# Patient Record
Sex: FEMALE | Race: WHITE | Employment: FULL TIME | URBAN - METROPOLITAN AREA
[De-identification: names, ages, dates, MRNs, and addresses within clinical notes are randomized per-mention and may not be internally consistent; named-entity substitution may affect disease eponyms.]

---

## 2018-08-15 ENCOUNTER — OFFICE VISIT (OUTPATIENT)
Dept: INTERNAL MEDICINE CLINIC | Age: 48
End: 2018-08-15

## 2018-08-15 VITALS
BODY MASS INDEX: 30.99 KG/M2 | WEIGHT: 186 LBS | SYSTOLIC BLOOD PRESSURE: 118 MMHG | HEIGHT: 65 IN | HEART RATE: 78 BPM | DIASTOLIC BLOOD PRESSURE: 80 MMHG

## 2018-08-15 DIAGNOSIS — Z13.220 LIPID SCREENING: ICD-10-CM

## 2018-08-15 DIAGNOSIS — Z00.00 ANNUAL PHYSICAL EXAM: Primary | ICD-10-CM

## 2018-08-15 DIAGNOSIS — Z13.1 SCREENING FOR DIABETES MELLITUS: ICD-10-CM

## 2018-08-15 PROCEDURE — 99396 PREV VISIT EST AGE 40-64: CPT | Performed by: INTERNAL MEDICINE

## 2018-08-15 PROCEDURE — 90715 TDAP VACCINE 7 YRS/> IM: CPT | Performed by: INTERNAL MEDICINE

## 2018-08-15 PROCEDURE — 90471 IMMUNIZATION ADMIN: CPT | Performed by: INTERNAL MEDICINE

## 2018-08-15 RX ORDER — FLUOXETINE 10 MG/1
10 CAPSULE ORAL
COMMUNITY
End: 2018-10-23 | Stop reason: SDUPTHER

## 2018-08-15 ASSESSMENT — PATIENT HEALTH QUESTIONNAIRE - PHQ9
2. FEELING DOWN, DEPRESSED OR HOPELESS: 0
SUM OF ALL RESPONSES TO PHQ9 QUESTIONS 1 & 2: 0
SUM OF ALL RESPONSES TO PHQ QUESTIONS 1-9: 0
SUM OF ALL RESPONSES TO PHQ QUESTIONS 1-9: 0
1. LITTLE INTEREST OR PLEASURE IN DOING THINGS: 0

## 2018-10-16 ENCOUNTER — TELEPHONE (OUTPATIENT)
Dept: INTERNAL MEDICINE CLINIC | Age: 48
End: 2018-10-16

## 2018-10-23 ENCOUNTER — OFFICE VISIT (OUTPATIENT)
Dept: INTERNAL MEDICINE CLINIC | Age: 48
End: 2018-10-23
Payer: COMMERCIAL

## 2018-10-23 VITALS
OXYGEN SATURATION: 98 % | BODY MASS INDEX: 30.55 KG/M2 | HEART RATE: 74 BPM | DIASTOLIC BLOOD PRESSURE: 80 MMHG | WEIGHT: 185 LBS | SYSTOLIC BLOOD PRESSURE: 126 MMHG

## 2018-10-23 DIAGNOSIS — F32.81 PMDD (PREMENSTRUAL DYSPHORIC DISORDER): ICD-10-CM

## 2018-10-23 DIAGNOSIS — G43.109 MIGRAINE WITH AURA AND WITHOUT STATUS MIGRAINOSUS, NOT INTRACTABLE: Primary | ICD-10-CM

## 2018-10-23 DIAGNOSIS — J30.9 ALLERGIC RHINITIS, UNSPECIFIED SEASONALITY, UNSPECIFIED TRIGGER: ICD-10-CM

## 2018-10-23 PROCEDURE — 99214 OFFICE O/P EST MOD 30 MIN: CPT | Performed by: INTERNAL MEDICINE

## 2018-10-23 RX ORDER — FLUOXETINE 10 MG/1
10 CAPSULE ORAL DAILY
Qty: 90 CAPSULE | Refills: 1 | Status: SHIPPED | OUTPATIENT
Start: 2018-10-23 | End: 2019-04-27 | Stop reason: SDUPTHER

## 2018-10-23 RX ORDER — ELETRIPTAN HYDROBROMIDE 20 MG/1
20 TABLET, FILM COATED ORAL
Qty: 9 TABLET | Refills: 3 | Status: SHIPPED | OUTPATIENT
Start: 2018-10-23 | End: 2019-12-20 | Stop reason: SDUPTHER

## 2018-10-23 NOTE — PROGRESS NOTES
per HPI. Patient Active Problem List   Diagnosis    Migraine    Menorrhagia    PMDD (premenstrual dysphoric disorder)     Prior to Visit Medications    Medication Sig Taking? Authorizing Provider   FLUoxetine (PROZAC) 10 MG capsule Take 1 capsule by mouth daily Yes Clee Fowler MD   eletriptan (RELPAX) 20 MG tablet Take 1 tablet by mouth once as needed for Migraine may repeat in 2 hours if necessary Yes Cele Fowler MD   fluticasone (FLONASE) 50 MCG/ACT nasal spray 2 sprays by Nasal route daily Yes Cele Fowler MD   ibuprofen (ADVIL;MOTRIN) 400 MG tablet Take 400 mg by mouth every 6 hours as needed for Pain. Yes Historical Provider, MD   Multiple Vitamin (MULTIVITAMIN PO) Take  by mouth daily. Also takes calcium magnesium and zinc supplement Yes Historical Provider, MD     Allergies   Allergen Reactions    Imitrex [Sumatriptan]      Worse nausea and \"weird feeling\"     Social History   Substance Use Topics    Smoking status: Never Smoker    Smokeless tobacco: Never Used    Alcohol use 0.0 - 2.4 oz/week     3 Glasses of wine per week       BP Readings from Last 3 Encounters:   10/23/18 126/80   08/15/18 118/80   03/25/16 120/74     Wt Readings from Last 3 Encounters:   10/23/18 185 lb (83.9 kg)   08/15/18 186 lb (84.4 kg)   03/25/16 174 lb (78.9 kg)     OBJECTIVE:  Vitals:    10/23/18 1129   BP: 126/80   Pulse: 74   SpO2: 98%   Weight: 185 lb (83.9 kg)       Physical Exam   Constitutional: No distress. HENT:   Fullness of both tympanic membranes, right more than left. No redness. External auditory canals clear. Cobblestoning of the oropharynx   Eyes: Pupils are equal, round, and reactive to light. EOM are normal.   Cardiovascular: Normal rate and regular rhythm. Pulmonary/Chest: Effort normal and breath sounds normal.   Musculoskeletal: She exhibits no edema. Psychiatric: She has a normal mood and affect.  Her behavior is normal.

## 2019-04-29 NOTE — TELEPHONE ENCOUNTER
Last visit: 10/23/18  Last filled: 10/23/19  Next visit: no follow up indicated on last visit, does she need follow up?

## 2019-04-30 NOTE — TELEPHONE ENCOUNTER
I will refill, but afraid message will be missed, so not signing yet until I can tell it's been addressed. Needs to schedule routine follow-up appointment.

## 2019-05-01 RX ORDER — FLUOXETINE 10 MG/1
10 CAPSULE ORAL DAILY
Qty: 90 CAPSULE | Refills: 0 | Status: SHIPPED | OUTPATIENT
Start: 2019-05-01 | End: 2019-07-12 | Stop reason: SDUPTHER

## 2019-05-31 ENCOUNTER — OFFICE VISIT (OUTPATIENT)
Dept: INTERNAL MEDICINE CLINIC | Age: 49
End: 2019-05-31
Payer: COMMERCIAL

## 2019-05-31 VITALS
DIASTOLIC BLOOD PRESSURE: 72 MMHG | OXYGEN SATURATION: 98 % | WEIGHT: 188 LBS | HEART RATE: 72 BPM | BODY MASS INDEX: 31.05 KG/M2 | SYSTOLIC BLOOD PRESSURE: 118 MMHG

## 2019-05-31 DIAGNOSIS — G43.109 MIGRAINE WITH AURA AND WITHOUT STATUS MIGRAINOSUS, NOT INTRACTABLE: Primary | ICD-10-CM

## 2019-05-31 DIAGNOSIS — J30.9 ALLERGIC RHINITIS, UNSPECIFIED SEASONALITY, UNSPECIFIED TRIGGER: ICD-10-CM

## 2019-05-31 PROCEDURE — 99214 OFFICE O/P EST MOD 30 MIN: CPT | Performed by: INTERNAL MEDICINE

## 2019-05-31 RX ORDER — TOPIRAMATE 50 MG/1
50 TABLET, FILM COATED ORAL NIGHTLY
Qty: 30 TABLET | Refills: 1 | Status: SHIPPED | OUTPATIENT
Start: 2019-05-31 | End: 2019-07-12 | Stop reason: SDUPTHER

## 2019-05-31 NOTE — PROGRESS NOTES
[facility]  Internal Medicine Progress Note  Wilmar Wellington MD  5/31/2019    Karishma MCDONALDJ:[birth year]    BP Readings from Last 3 Encounters:   05/31/19 118/72   10/23/18 126/80   08/15/18 118/80      Wt Readings from Last 3 Encounters:   05/31/19 188 lb (85.3 kg)   10/23/18 185 lb (83.9 kg)   08/15/18 186 lb (84.4 kg)       ASSESSMENT/PLAN:   Migraine with aura and without status migrainosus, not intractable  Still very symptomatic with 10-11 migraines most months. Start topamax 50 mg, starting at 1/2 tab first week. Discussed risks and benefits of the medication, including most common side effects. Allergic rhinitis, unspecified seasonality, unspecified trigger  Better with flonase  Continue  t week. Discussed medications with patient who voiced understanding of their use and indications. All questions answered. Return in about 1 month (around 6/30/2019) for 15 min- migraine. This note was generated completely or in part utilizing Dragon dictation speech recognition software. Occasionally, words are mistranscribed and despite editing, the text may contain inaccuracies due to incorrect word recognition. If further clarification is needed please contact the office at [phone] assistant triage:   Chief Complaint   Patient presents with    Follow-up       HPI:   54 y.o. female here today for evaluation of the following medical conditions/concerns: Allergies- using flonase consistently. Seems to help with congestion and stuffiness but not with migraines. Migraines really fluctuate. Nov-Dec 10-11/month. Down in Jan, bad Feb, good March,  5/month, April bad again. Often will then have day when everything hurts and feels horrible, and then gets migraine. Mood will be worse at those times as well. Very consistent now with flonase and fluoxetine. In the past was on topamax. Not a good sleeper ever. Tosses and turns. Melatonin caused weird.      Review of Systems As per HPI. Prior to Visit Medications    Medication Sig Taking? Authorizing Provider   topiramate (TOPAMAX) 50 MG tablet Take 1 tablet by mouth nightly Stat with 1/2 tab first week. Yes Jered Ornelas MD   FLUoxetine (PROZAC) 10 MG capsule TAKE 1 CAPSULE BY MOUTH DAILY Yes Jered Ornelas MD   eletriptan (RELPAX) 20 MG tablet Take 1 tablet by mouth once as needed for Migraine may repeat in 2 hours if necessary Yes Jered Ornelas MD   fluticasone (FLONASE) 50 MCG/ACT nasal spray 2 sprays by Nasal route daily Yes Jered Ornelas MD   ibuprofen (ADVIL;MOTRIN) 400 MG tablet Take 400 mg by mouth every 6 hours as needed for Pain. Yes Historical Provider, MD   Multiple Vitamin (MULTIVITAMIN PO) Take  by mouth daily. Also takes calcium magnesium and zinc supplement Yes Historical Provider, MD     Social History     Tobacco Use   Smoking Status Never Smoker   Smokeless Tobacco Never Used       OBJECTIVE:    Vitals:    05/31/19 1456   BP: 118/72   Pulse: 72   SpO2: 98%   Weight: 188 lb (85.3 kg)     Body mass index is 31.05 kg/m². Physical Exam   Constitutional: No distress. Cardiovascular: Normal rate and regular rhythm. Pulmonary/Chest: Effort normal and breath sounds normal.   Musculoskeletal: She exhibits no edema. Psychiatric: She has a normal mood and affect.  Her behavior is normal.

## 2019-07-12 ENCOUNTER — OFFICE VISIT (OUTPATIENT)
Dept: INTERNAL MEDICINE CLINIC | Age: 49
End: 2019-07-12
Payer: COMMERCIAL

## 2019-07-12 VITALS
DIASTOLIC BLOOD PRESSURE: 64 MMHG | HEART RATE: 66 BPM | BODY MASS INDEX: 31.05 KG/M2 | WEIGHT: 188 LBS | OXYGEN SATURATION: 98 % | SYSTOLIC BLOOD PRESSURE: 122 MMHG

## 2019-07-12 DIAGNOSIS — G43.109 MIGRAINE WITH AURA AND WITHOUT STATUS MIGRAINOSUS, NOT INTRACTABLE: Primary | ICD-10-CM

## 2019-07-12 PROCEDURE — 99213 OFFICE O/P EST LOW 20 MIN: CPT | Performed by: INTERNAL MEDICINE

## 2019-07-12 RX ORDER — FLUOXETINE 10 MG/1
10 CAPSULE ORAL DAILY
Qty: 90 CAPSULE | Refills: 1 | Status: SHIPPED | OUTPATIENT
Start: 2019-07-12 | End: 2019-12-19 | Stop reason: SDUPTHER

## 2019-07-12 RX ORDER — TOPIRAMATE 50 MG/1
50 TABLET, FILM COATED ORAL NIGHTLY
Qty: 90 TABLET | Refills: 1 | Status: SHIPPED | OUTPATIENT
Start: 2019-07-12 | End: 2019-12-20 | Stop reason: SDUPTHER

## 2019-07-12 NOTE — PROGRESS NOTES
[facility]  Internal Medicine Progress Note  Jaret Longoria MD  2019    Ronen Stark  :1970    ASSESSMENT/PLAN:   Migraine with aura and without status migrainosus, not intractable  Good response to Topamax 50 mg.  Trivial side effects, tolerable. Continue same dose    Discussed medications with patient who voiced understanding of their use and indications. All questions answered. Return in about 5 months (around 2019) for CPE. This note was generated completely or in part utilizing Dragon dictation speech recognition software. Occasionally, words are mistranscribed and despite editing, the text may contain inaccuracies due to incorrect word recognition. If further clarification is needed please contact the office at [phone] triage:   Chief Complaint   Patient presents with    Follow-up       HPI:   54 y.o. female here today for follow-up of migraines    Added Topamax for migraine prevention when seen . Has done well. Has seen significant reduction in migraine frequency. Had just 1 migraine and then last week had an aura that did not go into a headache. Slight decrease in appetite but no weight loss. Just back from cruise. Some tingling. Trivial. Gets a little tired in afternoon, but better as on it longer. Review of Systems As per HPI. Prior to Visit Medications    Medication Sig Taking? Authorizing Provider   topiramate (TOPAMAX) 50 MG tablet Take 1 tablet by mouth nightly Yes Jaret Longoria MD   FLUoxetine (PROZAC) 10 MG capsule Take 1 capsule by mouth daily Yes Jaret Longoria MD   eletriptan (RELPAX) 20 MG tablet Take 1 tablet by mouth once as needed for Migraine may repeat in 2 hours if necessary Yes Jaret Longoria MD   fluticasone (FLONASE) 50 MCG/ACT nasal spray 2 sprays by Nasal route daily Yes Jaret Longoria MD   ibuprofen (ADVIL;MOTRIN) 400 MG tablet Take 400 mg by mouth every 6 hours as needed for Pain.  Yes Historical Provider, MD   Multiple Vitamin (MULTIVITAMIN PO) Take  by mouth daily. Also takes calcium magnesium and zinc supplement Yes Historical Provider, MD     Social History     Tobacco Use   Smoking Status Never Smoker   Smokeless Tobacco Never Used       OBJECTIVE:  BP Readings from Last 3 Encounters:   07/12/19 122/64   05/31/19 118/72   10/23/18 126/80      Wt Readings from Last 3 Encounters:   07/12/19 188 lb (85.3 kg)   05/31/19 188 lb (85.3 kg)   10/23/18 185 lb (83.9 kg)     Vitals:    07/12/19 1545   BP: 122/64   Pulse: 66   SpO2: 98%   Weight: 188 lb (85.3 kg)     Body mass index is 31.05 kg/m². Physical Exam   Constitutional: No distress. Psychiatric: She has a normal mood and affect.  Her behavior is normal.

## 2019-08-15 RX ORDER — FLUOXETINE 10 MG/1
10 CAPSULE ORAL DAILY
Qty: 90 CAPSULE | Refills: 0 | Status: SHIPPED | OUTPATIENT
Start: 2019-08-15 | End: 2019-12-20 | Stop reason: SDUPTHER

## 2019-12-20 ENCOUNTER — OFFICE VISIT (OUTPATIENT)
Dept: INTERNAL MEDICINE CLINIC | Age: 49
End: 2019-12-20
Payer: COMMERCIAL

## 2019-12-20 VITALS
BODY MASS INDEX: 29.23 KG/M2 | DIASTOLIC BLOOD PRESSURE: 72 MMHG | OXYGEN SATURATION: 98 % | HEART RATE: 74 BPM | SYSTOLIC BLOOD PRESSURE: 128 MMHG | WEIGHT: 177 LBS

## 2019-12-20 DIAGNOSIS — Z00.00 ANNUAL PHYSICAL EXAM: Primary | ICD-10-CM

## 2019-12-20 DIAGNOSIS — H91.90 DECREASED HEARING, UNSPECIFIED LATERALITY: ICD-10-CM

## 2019-12-20 DIAGNOSIS — Z13.220 LIPID SCREENING: ICD-10-CM

## 2019-12-20 DIAGNOSIS — H93.19 TINNITUS, UNSPECIFIED LATERALITY: ICD-10-CM

## 2019-12-20 DIAGNOSIS — G43.909 MIGRAINE SYNDROME: ICD-10-CM

## 2019-12-20 DIAGNOSIS — Z13.1 SCREENING FOR DIABETES MELLITUS: ICD-10-CM

## 2019-12-20 PROCEDURE — 99396 PREV VISIT EST AGE 40-64: CPT | Performed by: INTERNAL MEDICINE

## 2019-12-20 RX ORDER — TOPIRAMATE 50 MG/1
50 TABLET, FILM COATED ORAL NIGHTLY
Qty: 90 TABLET | Refills: 1 | Status: SHIPPED | OUTPATIENT
Start: 2019-12-20 | End: 2020-12-02

## 2019-12-20 RX ORDER — FLUOXETINE 10 MG/1
10 CAPSULE ORAL DAILY
Qty: 90 CAPSULE | Refills: 0 | Status: SHIPPED | OUTPATIENT
Start: 2019-12-20 | End: 2021-01-13

## 2019-12-20 RX ORDER — ELETRIPTAN HYDROBROMIDE 20 MG/1
20 TABLET, FILM COATED ORAL
Qty: 9 TABLET | Refills: 3 | Status: SHIPPED | OUTPATIENT
Start: 2019-12-20 | End: 2021-01-13 | Stop reason: ALTCHOICE

## 2019-12-20 SDOH — HEALTH STABILITY: MENTAL HEALTH: HOW MANY STANDARD DRINKS CONTAINING ALCOHOL DO YOU HAVE ON A TYPICAL DAY?: 1 OR 2

## 2019-12-20 SDOH — HEALTH STABILITY: MENTAL HEALTH: HOW OFTEN DO YOU HAVE A DRINK CONTAINING ALCOHOL?: 2-4 TIMES A MONTH

## 2020-03-09 RX ORDER — TOPIRAMATE 50 MG/1
TABLET, FILM COATED ORAL
Qty: 90 TABLET | Refills: 1 | OUTPATIENT
Start: 2020-03-09

## 2020-05-28 DIAGNOSIS — Z13.1 SCREENING FOR DIABETES MELLITUS: ICD-10-CM

## 2020-05-28 DIAGNOSIS — Z13.220 LIPID SCREENING: ICD-10-CM

## 2020-05-28 LAB
ANION GAP SERPL CALCULATED.3IONS-SCNC: 10 MMOL/L (ref 3–16)
BUN BLDV-MCNC: 13 MG/DL (ref 7–20)
CALCIUM SERPL-MCNC: 9.1 MG/DL (ref 8.3–10.6)
CHLORIDE BLD-SCNC: 105 MMOL/L (ref 99–110)
CHOLESTEROL, FASTING: 221 MG/DL (ref 0–199)
CO2: 23 MMOL/L (ref 21–32)
CREAT SERPL-MCNC: 0.9 MG/DL (ref 0.6–1.1)
GFR AFRICAN AMERICAN: >60
GFR NON-AFRICAN AMERICAN: >60
GLUCOSE FASTING: 97 MG/DL (ref 70–99)
HDLC SERPL-MCNC: 58 MG/DL (ref 40–60)
LDL CHOLESTEROL CALCULATED: 145 MG/DL
POTASSIUM SERPL-SCNC: 4.5 MMOL/L (ref 3.5–5.1)
SODIUM BLD-SCNC: 138 MMOL/L (ref 136–145)
TRIGLYCERIDE, FASTING: 88 MG/DL (ref 0–150)
VLDLC SERPL CALC-MCNC: 18 MG/DL

## 2020-05-29 LAB
ESTIMATED AVERAGE GLUCOSE: 96.8 MG/DL
HBA1C MFR BLD: 5 %

## 2020-05-31 PROBLEM — E78.00 HYPERCHOLESTEROLEMIA: Status: ACTIVE | Noted: 2020-05-31

## 2021-01-13 ENCOUNTER — OFFICE VISIT (OUTPATIENT)
Dept: INTERNAL MEDICINE CLINIC | Age: 51
End: 2021-01-13
Payer: COMMERCIAL

## 2021-01-13 VITALS
OXYGEN SATURATION: 100 % | BODY MASS INDEX: 30.99 KG/M2 | HEART RATE: 99 BPM | SYSTOLIC BLOOD PRESSURE: 122 MMHG | RESPIRATION RATE: 14 BRPM | HEIGHT: 65 IN | DIASTOLIC BLOOD PRESSURE: 72 MMHG | WEIGHT: 186 LBS | TEMPERATURE: 97.1 F

## 2021-01-13 DIAGNOSIS — F32.81 PMDD (PREMENSTRUAL DYSPHORIC DISORDER): ICD-10-CM

## 2021-01-13 DIAGNOSIS — J30.89 NON-SEASONAL ALLERGIC RHINITIS, UNSPECIFIED TRIGGER: ICD-10-CM

## 2021-01-13 DIAGNOSIS — G43.909 MIGRAINE SYNDROME: Primary | ICD-10-CM

## 2021-01-13 DIAGNOSIS — E78.00 HYPERCHOLESTEROLEMIA: ICD-10-CM

## 2021-01-13 PROCEDURE — 99214 OFFICE O/P EST MOD 30 MIN: CPT | Performed by: INTERNAL MEDICINE

## 2021-01-13 RX ORDER — TOPIRAMATE 50 MG/1
TABLET, FILM COATED ORAL
Qty: 90 TABLET | Refills: 2 | Status: SHIPPED | OUTPATIENT
Start: 2021-01-13 | End: 2021-07-16 | Stop reason: SDUPTHER

## 2021-01-13 RX ORDER — RIZATRIPTAN BENZOATE 10 MG/1
10 TABLET, ORALLY DISINTEGRATING ORAL
Qty: 9 TABLET | Refills: 3 | Status: SHIPPED | OUTPATIENT
Start: 2021-01-13 | End: 2022-09-30

## 2021-01-13 NOTE — PROGRESS NOTES
2021  Zachary Zamarripa (:  1970)       ASSESSMENT/PLAN:   1. Migraine syndrome  Assessment & Plan:  Stable frequency but no fully effective abortive med. Intol  Imitrex, relpax not working. Start maxalt MLT 10 mg prn. Ok to continue Ibuprofen 800 mg prn.   2. PMDD (premenstrual dysphoric disorder)  Assessment & Plan:  Off Prozac since July. Weaned to see if she still needed it. Doing well. No need to restart at this time. 3. Hypercholesterolemia  Assessment & Plan:  Off track with lifestyle changes. Will work on this and check this summer with physical.  4. Non-seasonal allergic rhinitis, unspecified trigger  Assessment & Plan:  Has residual symptoms while using both Allegra and Flonase. Discussed treatment options including adding Singulair or considering immunotherapy with allergist, but patient declines at this point      Return in about 6 months (around 2021) for CPE/chronic. SUBJECTIVE/OBJECTIVE:  52 y.o. female established patient here for evaluation of the following chief complaint(s): Medication Refill    Year round nasal congestion and postnasal drainage. Taking allegra and flonase. Itchy eyes. Thinks allergic to dogs. Not bad enough to add med or see allergist.     Kayode Kaylissettelich still about 3-4 x/month. Always gets one the day before period. Uses topamax daily but not taking triptan. Relpax last few times didn't work. Uses ibuprofen 800 mg at onset    Weaned self off prozac in July. Doing well. Normal ups and downs. Review of Systems  Outpatient Medications Marked as Taking for the 21 encounter (Office Visit) with Ashok Calderon MD   Medication Sig Dispense Refill    rizatriptan (MAXALT-MLT) 10 MG disintegrating tablet Take 1 tablet by mouth once as needed for Migraine May repeat in 2 hours if needed. Maximum dose- 2 tablets/24 hours.  9 tablet 3    topiramate (TOPAMAX) 50 MG tablet TAKE 1 TABLET EACH EVENING 90 tablet 2    VITAMIN D PO Take by mouth      fluticasone (FLONASE) 50 MCG/ACT nasal spray 2 sprays by Nasal route daily 1 Bottle 5    ibuprofen (ADVIL;MOTRIN) 400 MG tablet Take 400 mg by mouth every 6 hours as needed for Pain.  Multiple Vitamin (MULTIVITAMIN PO) Take  by mouth daily. Also takes calcium magnesium and zinc supplement       Physical Exam  Constitutional:       General: She is not in acute distress. Cardiovascular:      Rate and Rhythm: Normal rate and regular rhythm. Pulmonary:      Effort: Pulmonary effort is normal.      Breath sounds: Normal breath sounds. Psychiatric:         Mood and Affect: Mood normal.               This note was generated completely or in part utilizing Dragon dictation speech recognition software. Occasionally, words are mistranscribed and despite editing, the text may contain inaccuracies due to incorrect word recognition. If further clarification is needed please contact the office at [phone]  An electronic signature was used to authenticate this note.     --Isabelle Jeans, MD

## 2021-01-13 NOTE — ASSESSMENT & PLAN NOTE
Off Prozac since July. Weaned to see if she still needed it. Doing well. No need to restart at this time.

## 2021-01-13 NOTE — ASSESSMENT & PLAN NOTE
Has residual symptoms while using both Allegra and Flonase.   Discussed treatment options including adding Singulair or considering immunotherapy with allergist, but patient declines at this point

## 2021-03-29 ENCOUNTER — TELEPHONE (OUTPATIENT)
Dept: INTERNAL MEDICINE CLINIC | Age: 51
End: 2021-03-29

## 2021-03-29 ENCOUNTER — TELEPHONE (OUTPATIENT)
Dept: GYNECOLOGY | Age: 51
End: 2021-03-29

## 2021-03-29 NOTE — TELEPHONE ENCOUNTER
Called patient she does not want to make an appointment at this time, she will look ovr her schedule and call us back ay her convenience.  DONE

## 2021-03-29 NOTE — TELEPHONE ENCOUNTER
----- Message from [facility] sent at 3/29/2021 10:23 AM EDT -----  Subject: Referral Request    QUESTIONS   Reason for referral request? Needs a Gynecologist   Has the physician seen you for this condition before? Yes  Select a date? 2021-01-13  Select the physician (PCP or Specialist)? Gal Shah   Preferred Specialist (if applicable)? Do you already have an appointment scheduled? No  Additional Information for Provider? pt would like a referral for a   Gynecologist that accepts her insurance. Call pt with referral.   ---------------------------------------------------------------------------  --------------  CALL BACK INFO  What is the best way for the office to contact you? OK to leave message on   voicemail  Preferred Call Back Phone Number?  [phone]

## 2021-03-29 NOTE — TELEPHONE ENCOUNTER
Patient states at her last appointment, Dr. Tye Diop had several recommendation as to which gynecologist she recommends. Prior phone encounter states she wanted a referral, which isn't the case. She only wants recommendation. Please contact patient @ phone # provided with that list of gynecologist Dr. Tye Diop recommends.

## 2021-03-30 NOTE — TELEPHONE ENCOUNTER
I recommend , at our office.  Other local gynecologists that I recommend include:    MD Cindy Canada Ala, MD  [phone]     [facility]  [place] [place] -[phone]    Gasper Prescott -[phone]

## 2021-07-16 ENCOUNTER — OFFICE VISIT (OUTPATIENT)
Dept: INTERNAL MEDICINE CLINIC | Age: 51
End: 2021-07-16
Payer: COMMERCIAL

## 2021-07-16 VITALS
WEIGHT: 178.8 LBS | HEART RATE: 74 BPM | SYSTOLIC BLOOD PRESSURE: 124 MMHG | BODY MASS INDEX: 29.75 KG/M2 | OXYGEN SATURATION: 99 % | DIASTOLIC BLOOD PRESSURE: 76 MMHG

## 2021-07-16 DIAGNOSIS — G43.909 MIGRAINE SYNDROME: ICD-10-CM

## 2021-07-16 DIAGNOSIS — Z00.00 ANNUAL PHYSICAL EXAM: Primary | ICD-10-CM

## 2021-07-16 DIAGNOSIS — Z12.11 COLON CANCER SCREENING: ICD-10-CM

## 2021-07-16 DIAGNOSIS — Z13.220 LIPID SCREENING: ICD-10-CM

## 2021-07-16 DIAGNOSIS — Z13.1 SCREENING FOR DIABETES MELLITUS: ICD-10-CM

## 2021-07-16 DIAGNOSIS — Z11.59 ENCOUNTER FOR HEPATITIS C SCREENING TEST FOR LOW RISK PATIENT: ICD-10-CM

## 2021-07-16 PROCEDURE — 99396 PREV VISIT EST AGE 40-64: CPT | Performed by: INTERNAL MEDICINE

## 2021-07-16 RX ORDER — TOPIRAMATE 50 MG/1
TABLET, FILM COATED ORAL
Qty: 90 TABLET | Refills: 2 | Status: SHIPPED | OUTPATIENT
Start: 2021-07-16 | End: 2022-05-17

## 2021-07-16 SDOH — ECONOMIC STABILITY: FOOD INSECURITY: WITHIN THE PAST 12 MONTHS, THE FOOD YOU BOUGHT JUST DIDN'T LAST AND YOU DIDN'T HAVE MONEY TO GET MORE.: NEVER TRUE

## 2021-07-16 SDOH — ECONOMIC STABILITY: FOOD INSECURITY: WITHIN THE PAST 12 MONTHS, YOU WORRIED THAT YOUR FOOD WOULD RUN OUT BEFORE YOU GOT MONEY TO BUY MORE.: NEVER TRUE

## 2021-07-16 ASSESSMENT — SOCIAL DETERMINANTS OF HEALTH (SDOH): HOW HARD IS IT FOR YOU TO PAY FOR THE VERY BASICS LIKE FOOD, HOUSING, MEDICAL CARE, AND HEATING?: NOT HARD AT ALL

## 2021-07-16 ASSESSMENT — ENCOUNTER SYMPTOMS
RESPIRATORY NEGATIVE: 1
EYES NEGATIVE: 1
GASTROINTESTINAL NEGATIVE: 1

## 2021-07-16 NOTE — PROGRESS NOTES
History and Physical      Janneth Bass  : 1970    Date of Service: 2021    Chief Complaint:   Janneth Bass is a 54 y.o. female who presents for complete physical examination. HPI:  Overall doing well. Migraines good lately. Exercise: Belongs to  [facility]. Doing Pilates reformer once weekly. Wants to exercise more. Diet: Healthy but hard time with sugar. Lost 8# since January but eating better. Patient's last menstrual period was 2021. Still regular menses.     Patient Care Team:  Marsha Hodgson MD as PCP - General (Internal Medicine)  Marsha Hodgson MD as PCP - [facility]  Denise Quarles MD as Consulting Physician (Obstetrics & Gynecology)    Health Maintenance   Topic Date Due    Hepatitis C screen  Never done    Colon cancer screen colonoscopy  Never done    Flu vaccine (1) 2021    Diabetes screen  2023    Lipid screen  2025    Cervical cancer screen  2026    DTaP/Tdap/Td vaccine (3 - Td or Tdap) 08/15/2028    COVID-19 Vaccine  Completed    Hepatitis A vaccine  Aged Out    Hepatitis B vaccine  Aged Out    Hib vaccine  Aged Out    Meningococcal (ACWY) vaccine  Aged Out    Pneumococcal 0-64 years Vaccine  Aged Out    HIV screen  Discontinued       Immunization History   Administered Date(s) Administered    COVID-19, Moderna, PF, 100mcg/0.5mL 2021, 2021    Influenza Virus Vaccine 2020    Tdap (Boostrix, Adacel) 2009, 08/15/2018       Allergies   Allergen Reactions    Imitrex [Sumatriptan]      Worse nausea and \"weird feeling\"       Outpatient Medications Marked as Taking for the 21 encounter (Office Visit) with Marsha Hodgson MD   Medication Sig Dispense Refill    VITAMIN E PO Take by mouth      Coenzyme Q10 (CO Q 10 PO) Take by mouth      topiramate (TOPAMAX) 50 MG tablet TAKE 1 TABLET EACH EVENING 90 tablet 2    rizatriptan (MAXALT-MLT) 10 MG disintegrating tablet Take 1 tablet by mouth once as needed for Migraine May repeat in 2 hours if needed. Maximum dose- 2 tablets/24 hours. 9 tablet 3    VITAMIN D PO Take by mouth      fluticasone (FLONASE) 50 MCG/ACT nasal spray 2 sprays by Nasal route daily 1 Bottle 5    ibuprofen (ADVIL;MOTRIN) 400 MG tablet Take 400 mg by mouth every 6 hours as needed for Pain.  Multiple Vitamin (MULTIVITAMIN PO) Take  by mouth daily.  Also takes calcium magnesium and zinc supplement         Past Medical History:   Diagnosis Date    Anxiety     Depression     History of depression     Took fluoxetine [id number]    Hypercholesterolemia 2020    Migraine     Seasonal allergies      Past Surgical History:   Procedure Laterality Date     SECTION  03, 12/18/07    x2    NASAL POLYP SURGERY      at age 10     Family History   Problem Relation Age of Onset   Alonso Capellan Migraines Mother         brother also    Stroke Mother 67        ischemic    Cancer Mother         CLL    Diabetes Father     High Cholesterol Father     Coronary Art Dis Father 67        massive MI    Depression Father     Heart Attack Father     Heart Disease Father     Migraines Brother     Coronary Art Dis Maternal Grandmother          of heart attack     Social History     Socioeconomic History    Marital status:      Spouse name: Ulysees Board Number of children: 2    Years of education: Not on file    Highest education level: Not on file   Occupational History    Occupation: masters in special ed    Occupation: [employer], [employer], 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    Tobacco Use    Smoking status: Never Smoker    Smokeless tobacco: Never Used   Substance and Sexual Activity    Alcohol use: Not Currently     Alcohol/week: 0.0 - 3.0 standard drinks    Drug use: No    Sexual activity: Yes     Partners: Male   Other Topics Concern    Not on file   Social History Narrative    Walking and yoga      Social Determinants of Health     Financial Resource Strain: Low Risk     Difficulty of Paying Living Expenses: Not hard at all   Food Insecurity: No Food Insecurity    Worried About [address] [employer] in the Last Year: Never true    Ran Out of Food in the Last Year: Never true   Transportation Needs:     Lack of Transportation (Medical):  Lack of Transportation (Non-Medical):    Physical Activity:     Days of Exercise per Week:     Minutes of Exercise per Session:    Stress:     Feeling of Stress :    Social Connections:     Frequency of Communication with Friends and Family:     Frequency of Social Gatherings with Friends and Family:     Attends Congregational Services:     Active Member of Clubs or Organizations:     Attends Club or Organization Meetings:     Marital Status:    Intimate Partner Violence:     Fear of Current or Ex-Partner:     Emotionally Abused:     Physically Abused:     Sexually Abused:        Review of Systems   Constitutional: Negative. HENT: Negative. Eyes: Negative. Respiratory: Negative. Cardiovascular: Negative. Gastrointestinal: Negative. Genitourinary: Negative. Skin: Negative. Psychiatric/Behavioral: Negative. Wt Readings from Last 3 Encounters:   07/16/21 178 lb 12.8 oz (81.1 kg)   01/13/21 186 lb (84.4 kg)   12/20/19 177 lb (80.3 kg)     BP Readings from Last 3 Encounters:   07/16/21 124/76   01/13/21 122/72   12/20/19 128/72       Physical Exam:   Vitals:    07/16/21 0901   BP: 124/76   Pulse: 74   SpO2: 99%   Weight: 178 lb 12.8 oz (81.1 kg)     Body mass index is 29.75 kg/m². Physical Exam  Constitutional:       Appearance: Normal appearance. She is well-developed. HENT:      Head: Normocephalic and atraumatic. Right Ear: Tympanic membrane, ear canal and external ear normal.      Left Ear: Tympanic membrane, ear canal and external ear normal.      Nose: Nose normal.      Mouth/Throat:      Mouth: Mucous membranes are moist.      Pharynx: Oropharynx is clear.    Eyes:      Conjunctiva/sclera: Conjunctivae normal.      Pupils: Pupils are equal, round, and reactive to light. Neck:      Thyroid: No thyromegaly. Cardiovascular:      Rate and Rhythm: Normal rate and regular rhythm. Pulses: Normal pulses. Heart sounds: Normal heart sounds. No murmur heard. Pulmonary:      Effort: Pulmonary effort is normal.      Breath sounds: Normal breath sounds. Abdominal:      General: Bowel sounds are normal.      Palpations: Abdomen is soft. There is no mass. Tenderness: There is no abdominal tenderness. Genitourinary:     Exam position: Supine. Musculoskeletal:      Cervical back: Normal range of motion and neck supple. Right lower leg: No edema. Left lower leg: No edema. Lymphadenopathy:      Cervical: No cervical adenopathy. Skin:     General: Skin is warm and dry. Coloration: Skin is not pale. Comments: No suspicious skin lesions   Neurological:      General: No focal deficit present. Mental Status: She is alert. Psychiatric:         Mood and Affect: Mood normal.         Glucose:  Glucose (mg/dL)   Date Value   03/04/2016 71     Lipids  Lab Results   Component Value Date    CHOL 213 (H) 03/04/2016    TRIG 96 03/04/2016    HDL 58 05/28/2020    LDLCALC 145 (H) 05/28/2020       Assessment/Plan:  Annual PE/Wellness exam  Discussed age appropriate preventive care including healthy diet, daily exercise, immunizations and age & gender guided screening tests. Will return next week for fasting lipid and BMP. Colon cancer screening  -     ROSENDO - Tom Tomlin MD, Gastroenterology, [facility]    Migraine syndrome  Assessment & Plan:  Headaches less frequent, with 1 bad one per month. New triptan, Maxalt MLT helped. Continue on topamax. Return in about 1 year (around 7/16/2022) for CPE/chronic.   Annamarie Bruce MD

## 2021-07-16 NOTE — PATIENT INSTRUCTIONS
Recommendations for optimal health:  Be sure you are exercising at least 30 minutes  or 10,000 steps daily. Ideally you should try to get a mix of cardio and strength exercises. Work on [employer] For more detailed information, visit Nutrition Source web site- knowledge for healthy eating from [address] [place] [address]. [facility]      If your are using supplements, look for \"USP verified\" on the label. Helps to assure they are good quality. Vitamin D 800 units daily. Calcium intake - try for 800-1200 mg of calcium in combination of diet and supplements. You can read on this in much more detail on nutritionGamePixe. org    8 Nutrition Tips for Eating Right:  1. Choose good carbs, not no carbs. Whole grains are your best bet. 2. Pay attention to the protein package. Fish, poultry, nuts, and beans are the best choices. 3. Choose foods with healthy fats, limit foods high in saturated fat, and avoid foods with trans fat. Plant oils, nuts, and fish are the healthiest sources. 4. Choose a fiber-filled diet, rich in whole grains, vegetables, and fruits. 5. Eat more vegetables and fruits. Go for color and variety--dark green, yellow, orange, and red. 6. Calcium is important. But milk isnt the only, or even best, source. 7. Water is best to quench your thirst. Skip the sugary drinks, and go easy on the milk and juice. 8. Eating less salt is good for everyones health. Choose more fresh foods and fewer processed foods. Aim for 2-3 cups of vegetables daily and 1 1/2-2 cups of fruits daily.

## 2021-07-16 NOTE — ASSESSMENT & PLAN NOTE
Headaches less frequent, with 1 bad one per month. New triptan, Maxalt MLT helped. Continue on topamax.

## 2022-05-16 NOTE — TELEPHONE ENCOUNTER
Last appointment: 7/16/2021  Next appointment: Visit date not found  Last refill: 7/16/21  Sent [employer] message to schedule next appointment due in July.

## 2022-05-17 RX ORDER — TOPIRAMATE 50 MG/1
TABLET, FILM COATED ORAL
Qty: 90 TABLET | Refills: 0 | Status: SHIPPED | OUTPATIENT
Start: 2022-05-17 | End: 2022-08-29

## 2022-08-29 RX ORDER — TOPIRAMATE 50 MG/1
TABLET, FILM COATED ORAL
Qty: 90 TABLET | Refills: 0 | Status: SHIPPED | OUTPATIENT
Start: 2022-08-29

## 2022-08-30 NOTE — TELEPHONE ENCOUNTER
Refill has been approved. Please call patient to get next appointment scheduled. Overdue, message sent in [address] in May but hasnt been read.

## 2022-09-30 ENCOUNTER — TELEPHONE (OUTPATIENT)
Dept: INTERNAL MEDICINE CLINIC | Age: 52
End: 2022-09-30

## 2022-09-30 ENCOUNTER — OFFICE VISIT (OUTPATIENT)
Dept: INTERNAL MEDICINE CLINIC | Age: 52
End: 2022-09-30
Payer: COMMERCIAL

## 2022-09-30 VITALS
HEART RATE: 88 BPM | OXYGEN SATURATION: 98 % | HEIGHT: 65 IN | BODY MASS INDEX: 29.96 KG/M2 | WEIGHT: 179.8 LBS | SYSTOLIC BLOOD PRESSURE: 134 MMHG | TEMPERATURE: 97.6 F | DIASTOLIC BLOOD PRESSURE: 86 MMHG

## 2022-09-30 DIAGNOSIS — Z13.220 LIPID SCREENING: ICD-10-CM

## 2022-09-30 DIAGNOSIS — G43.909 MIGRAINE SYNDROME: Primary | ICD-10-CM

## 2022-09-30 DIAGNOSIS — Z23 NEED FOR INFLUENZA VACCINATION: ICD-10-CM

## 2022-09-30 DIAGNOSIS — R03.0 ELEVATED BLOOD-PRESSURE READING WITHOUT DIAGNOSIS OF HYPERTENSION: ICD-10-CM

## 2022-09-30 DIAGNOSIS — Z13.1 SCREENING FOR DIABETES MELLITUS: ICD-10-CM

## 2022-09-30 PROCEDURE — 90471 IMMUNIZATION ADMIN: CPT | Performed by: INTERNAL MEDICINE

## 2022-09-30 PROCEDURE — 90674 CCIIV4 VAC NO PRSV 0.5 ML IM: CPT | Performed by: INTERNAL MEDICINE

## 2022-09-30 PROCEDURE — 99214 OFFICE O/P EST MOD 30 MIN: CPT | Performed by: INTERNAL MEDICINE

## 2022-09-30 RX ORDER — TOPIRAMATE 25 MG/1
25 TABLET ORAL EVERY MORNING
Qty: 90 TABLET | Refills: 1 | Status: SHIPPED | OUTPATIENT
Start: 2022-09-30

## 2022-09-30 RX ORDER — UBROGEPANT 50 MG/1
50 TABLET ORAL DAILY
Qty: 16 TABLET | Refills: 0 | Status: SHIPPED | OUTPATIENT
Start: 2022-09-30 | End: 2022-09-30 | Stop reason: SDUPTHER

## 2022-09-30 RX ORDER — UBROGEPANT 50 MG/1
50 TABLET ORAL PRN
Qty: 16 TABLET | Refills: 0 | Status: SHIPPED | OUTPATIENT
Start: 2022-09-30

## 2022-09-30 SDOH — ECONOMIC STABILITY: FOOD INSECURITY: WITHIN THE PAST 12 MONTHS, THE FOOD YOU BOUGHT JUST DIDN'T LAST AND YOU DIDN'T HAVE MONEY TO GET MORE.: NEVER TRUE

## 2022-09-30 SDOH — ECONOMIC STABILITY: FOOD INSECURITY: WITHIN THE PAST 12 MONTHS, YOU WORRIED THAT YOUR FOOD WOULD RUN OUT BEFORE YOU GOT MONEY TO BUY MORE.: NEVER TRUE

## 2022-09-30 ASSESSMENT — PATIENT HEALTH QUESTIONNAIRE - PHQ9
SUM OF ALL RESPONSES TO PHQ QUESTIONS 1-9: 0
3. TROUBLE FALLING OR STAYING ASLEEP: 0
SUM OF ALL RESPONSES TO PHQ9 QUESTIONS 1 & 2: 0
8. MOVING OR SPEAKING SO SLOWLY THAT OTHER PEOPLE COULD HAVE NOTICED. OR THE OPPOSITE, BEING SO FIGETY OR RESTLESS THAT YOU HAVE BEEN MOVING AROUND A LOT MORE THAN USUAL: 0
SUM OF ALL RESPONSES TO PHQ QUESTIONS 1-9: 0
SUM OF ALL RESPONSES TO PHQ QUESTIONS 1-9: 0
2. FEELING DOWN, DEPRESSED OR HOPELESS: 0
9. THOUGHTS THAT YOU WOULD BE BETTER OFF DEAD, OR OF HURTING YOURSELF: 0
6. FEELING BAD ABOUT YOURSELF - OR THAT YOU ARE A FAILURE OR HAVE LET YOURSELF OR YOUR FAMILY DOWN: 0
4. FEELING TIRED OR HAVING LITTLE ENERGY: 0
7. TROUBLE CONCENTRATING ON THINGS, SUCH AS READING THE NEWSPAPER OR WATCHING TELEVISION: 0
1. LITTLE INTEREST OR PLEASURE IN DOING THINGS: 0
10. IF YOU CHECKED OFF ANY PROBLEMS, HOW DIFFICULT HAVE THESE PROBLEMS MADE IT FOR YOU TO DO YOUR WORK, TAKE CARE OF THINGS AT HOME, OR GET ALONG WITH OTHER PEOPLE: 0
5. POOR APPETITE OR OVEREATING: 0
SUM OF ALL RESPONSES TO PHQ QUESTIONS 1-9: 0

## 2022-09-30 ASSESSMENT — SOCIAL DETERMINANTS OF HEALTH (SDOH): HOW HARD IS IT FOR YOU TO PAY FOR THE VERY BASICS LIKE FOOD, HOUSING, MEDICAL CARE, AND HEATING?: NOT HARD AT ALL

## 2022-09-30 NOTE — PROGRESS NOTES
Alphonse Litten   :  1970    ASSESSMENT/PLAN:   1. Migraine syndrome  Assessment & Plan:  Increased frequency, inadequate relief with abortive therapy. Intolerance to multiple triptans. Increase topamax to 25/50 mg  Ubrelvy 50 mg prn migraine. 2. Elevated blood-pressure reading without diagnosis of hypertension  Comments:  Work on lifestyle management and recheck in 2-3 months. Flu shot today  Fasting labs done today in advance of physical      Return in about 2 months (around 2022) for CPE, med check. SUBJECTIVE     50 y.o. female established patient here for:   Chief Complaint   Patient presents with    Medication Check     More migraines, wonders if perimenopause. Also allergies bad this time of year. Approx 7/month, can be as good as 2-3x/month. Aura is \"floaters\", can't see during that time. Seem to last a few days not as well. Doesn't tolerate maxalt. Felt sick/nausea. Uses ibuprofen 600-800 mg and if can lay down for a little while will be good. When at school, harder. Nausea with headaches in general now. Has used zofran,but constipates her for a few days. Mom was living with them past year and  in July. Review of Systems    OBJECTIVE:  Vitals:    22 0814   BP: 134/86   Site: Right Upper Arm   Position: Sitting   Cuff Size: Medium Adult   Pulse: 88   Temp: 97.6 °F (36.4 °C)   TempSrc: Temporal   SpO2: 98%   Weight: 179 lb 12.8 oz (81.6 kg)   Height: 5' 5\" (1.651 m)     Physical Exam  Constitutional:       General: She is not in acute distress. Cardiovascular:      Rate and Rhythm: Normal rate and regular rhythm. Pulmonary:      Effort: Pulmonary effort is normal.      Breath sounds: Normal breath sounds. Musculoskeletal:      Right lower leg: No edema. Left lower leg: No edema. Psychiatric:         Mood and Affect: Mood normal.       This note was generated completely or in part utilizing Dragon dictation speech recognition software. Occasionally, words are mistranscribed and despite editing, the text may contain inaccuracies due to incorrect word recognition.   If further clarification is needed please contact the office at [phone]  --Kristel Monterroso MD

## 2022-09-30 NOTE — ASSESSMENT & PLAN NOTE
Increased frequency, inadequate relief with abortive therapy. Intolerance to multiple triptans. Increase topamax to 25/50 mg  Ubrelvy 50 mg prn migraine.

## 2022-09-30 NOTE — TELEPHONE ENCOUNTER
Ramin Sawyer from [address] [place] [address] is requesting clarification on the following prescription:  Ubrogepant (UBRELVY) 50 MG TABS    She believes the instructions should include \"as needed. \" Please contact Ramin Sawyer back at number provided to discuss.

## 2022-10-04 ENCOUNTER — TELEPHONE (OUTPATIENT)
Dept: ADMINISTRATIVE | Age: 52
End: 2022-10-04

## 2022-10-04 NOTE — TELEPHONE ENCOUNTER
Submitted PA for Ubrelvy   Via CMM y: [place] STATUS:  DENIED. LETTER ATTACHED. If this requires a response please respond to the pool. [address]). Please advise patient thank you.

## 2022-10-06 ENCOUNTER — TELEPHONE (OUTPATIENT)
Dept: INTERNAL MEDICINE CLINIC | Age: 52
End: 2022-10-06

## 2022-10-06 RX ORDER — RIMEGEPANT SULFATE 75 MG/75MG
75 TABLET, ORALLY DISINTEGRATING ORAL PRN
Qty: 9 TABLET | Refills: 1 | Status: SHIPPED | OUTPATIENT
Start: 2022-10-06 | End: 2022-10-06

## 2022-10-06 NOTE — TELEPHONE ENCOUNTER
Please call patient. Let her know the Monitor [employer] that I ordered for her migraines wasn't covered, but they will cover another in the same class called [facility], so I am sending in prescription for the medication for treatement of her migraine. It is a tablet that will disintegrate under the tongue, which makes it easier to take if nausea with the migraine.

## 2022-10-06 NOTE — TELEPHONE ENCOUNTER
Spoke with patient she already picked up Ubrelvy. Pharmacy did something and got it covered.  Cancelled script for Nurtec this is just an [place]

## 2022-11-30 ENCOUNTER — OFFICE VISIT (OUTPATIENT)
Dept: INTERNAL MEDICINE CLINIC | Age: 52
End: 2022-11-30
Payer: COMMERCIAL

## 2022-11-30 VITALS
HEART RATE: 82 BPM | HEIGHT: 65 IN | TEMPERATURE: 98.6 F | DIASTOLIC BLOOD PRESSURE: 86 MMHG | WEIGHT: 183.4 LBS | OXYGEN SATURATION: 100 % | SYSTOLIC BLOOD PRESSURE: 130 MMHG | BODY MASS INDEX: 30.56 KG/M2

## 2022-11-30 DIAGNOSIS — Z12.31 ENCOUNTER FOR SCREENING MAMMOGRAM FOR MALIGNANT NEOPLASM OF BREAST: ICD-10-CM

## 2022-11-30 DIAGNOSIS — Z00.00 WELL ADULT EXAM: Primary | ICD-10-CM

## 2022-11-30 DIAGNOSIS — M25.551 LATERAL PAIN OF RIGHT HIP: ICD-10-CM

## 2022-11-30 DIAGNOSIS — Z12.11 COLON CANCER SCREENING: ICD-10-CM

## 2022-11-30 PROCEDURE — 90750 HZV VACC RECOMBINANT IM: CPT | Performed by: INTERNAL MEDICINE

## 2022-11-30 PROCEDURE — 99396 PREV VISIT EST AGE 40-64: CPT | Performed by: INTERNAL MEDICINE

## 2022-11-30 PROCEDURE — 90471 IMMUNIZATION ADMIN: CPT | Performed by: INTERNAL MEDICINE

## 2022-11-30 NOTE — PROGRESS NOTES
ASSESSMENT/PLAN:   Wellness exam  Discussed age appropriate preventive care including healthy diet, daily exercise, immunizations and age & gender guided screening tests. Mammogram ordered. After discussion of colorectal cancer screening, patient would like to proceed with Cologuard. Shingrix vaccine today. Encouraged bivalent COVID booster. Lateral pain of right hip  Assessment & Plan:  Consistent with trochanteric pain syndrome. Trying ice and heat, ibuprofen, and PT referral provided. Orders:  -     Ambulatory referral to Physical Therapy    Return in about 1 year (around 2023) for CPE. Jhonatan Shanks (:  1970) is a 50 y.o. female, here for annual preventive visit. Lots  of stress. Executor of parents garcia, all coming to head. Auction  of house and property this week. [place] once, at school. Didn't like it. Seemed like it was worse. Didn't try the repeat dose. Exercise: pilates once weekly. Planning to add some classes. Diet: \"Not bad\". Doesn't eat out. No fried foods. Lots of fruits and vegetable. Sleep: Not great lately. Issue with right hip wakes her. Pain over right lateral hip and knee. Can't sleep on the side. Takes ibuprofen 400-800 mg no more than once a day, and not every day. Patient's last menstrual period was 2022 (exact date). Starting to skip periods.     Patient Care Team:  Katelyn Burgess MD as PCP - General (Internal Medicine)  Katelyn Burgess MD as PCP - [facility] Empaneled Provider  José Miguel Cox MD as Consulting Physician (Obstetrics & Gynecology)    Health Maintenance   Topic Date Due    Colorectal Cancer Screen  Never done    COVID-19 Vaccine (4 - Booster for [place] series) 2022    Shingles vaccine (1 of 2) Never done    Breast cancer screen  2022    Depression Monitoring  2023    Cervical cancer screen  2024    Diabetes screen  2025    Lipids  2027    DTaP/Tdap/Td vaccine (3 - Td or Tdap) 08/15/2028    Flu vaccine  Completed    Hepatitis C screen  Completed    Hepatitis A vaccine  Aged Out    Hib vaccine  Aged Out    Meningococcal (ACWY) vaccine  Aged Out    Pneumococcal 0-64 years Vaccine  Aged Out    HIV screen  Discontinued     Immunization History   Administered Date(s) Administered    COVID-19, MODERNA BLUE border, Primary or Immunocompromised, (age 12y+), IM, 100 mcg/0.5mL 2021, 2021, 2021    Influenza Virus Vaccine 2020    Influenza, FLUARIX, FLULAVAL, FLUZONE (age 10 mo+) AND AFLURIA, (age 1 y+), PF, 0.5mL 2021    Influenza, FLUCELVAX, (age 10 mo+), MDCK, PF, 0.5mL 2022    Tdap (Boostrix, Adacel) 2009, 08/15/2018       Past Medical History:   Diagnosis Date    Anxiety     Depression     History of depression     Took fluoxetine [id number]    Hypercholesterolemia 2020    Migraine     Seasonal allergies        Outpatient Medications Marked as Taking for the 22 encounter (Office Visit) with Gracie Chakraborty MD   Medication Sig Dispense Refill    topiramate (TOPAMAX) 25 MG tablet Take 1 tablet by mouth every morning Continue 50 mg at night 90 tablet 1    Ubrogepant (UBRELVY) 50 MG TABS Take 50 mg by mouth as needed (migraine) May repeat dose after 2  hours. 16 tablet 0    topiramate (TOPAMAX) 50 MG tablet TAKE 1 TABLET BY MOUTH ONCE DAILY IN THE EVENING . 90 tablet 0    fluticasone (FLONASE) 50 MCG/ACT nasal spray 2 sprays by Nasal route daily 1 Bottle 5    ibuprofen (ADVIL;MOTRIN) 400 MG tablet Take 400 mg by mouth every 6 hours as needed for Pain. Multiple Vitamin (MULTIVITAMIN PO) Take  by mouth daily.  Also takes calcium magnesium and zinc supplement          Allergies   Allergen Reactions    Imitrex [Sumatriptan]      Worse nausea and \"weird feeling\"       Past Surgical History:   Procedure Laterality Date     SECTION  03, 12/18/07    x2    NASAL POLYP SURGERY      at age 10       Social History     Tobacco Use    Smoking status: Never    Smokeless tobacco: Never   Substance Use Topics    Alcohol use: Not Currently     Alcohol/week: 0.0 - 3.0 standard drinks    Drug use: No        Family History   Problem Relation Age of Onset    Migraines Mother         brother also    Stroke Mother 67        ischemic    Cancer Mother         CLL    Diabetes Father     High Cholesterol Father     Coronary Art Dis Father 67        massive MI    Depression Father     Heart Attack Father     Heart Disease Father     Migraines Brother     Coronary Art Dis Maternal Grandmother          of heart attack     Review of Systems    Wt Readings from Last 5 Encounters:   22 183 lb 6.4 oz (83.2 kg)   22 179 lb 12.8 oz (81.6 kg)   21 178 lb 12.8 oz (81.1 kg)   21 186 lb (84.4 kg)   19 177 lb (80.3 kg)     Vitals:    22 0751   BP: 130/86   Site: Right Upper Arm   Position: Sitting   Cuff Size: Large Adult   Pulse: 82   Temp: 98.6 °F (37 °C)   TempSrc: Temporal   SpO2: 100%   Weight: 183 lb 6.4 oz (83.2 kg)   Height: 5' 5\" (1.651 m)     Estimated body mass index is 30.52 kg/m² as calculated from the following:    Height as of this encounter: 5' 5\" (1.651 m). Weight as of this encounter: 183 lb 6.4 oz (83.2 kg). Physical Exam  Constitutional:       Appearance: Normal appearance. She is well-developed. HENT:      Right Ear: Tympanic membrane, ear canal and external ear normal.      Left Ear: Tympanic membrane, ear canal and external ear normal.      Nose: Nose normal.      Mouth/Throat:      Mouth: Mucous membranes are moist.      Pharynx: Oropharynx is clear. Eyes:      Conjunctiva/sclera: Conjunctivae normal.      Pupils: Pupils are equal, round, and reactive to light. Neck:      Thyroid: No thyromegaly. Cardiovascular:      Rate and Rhythm: Normal rate and regular rhythm. Pulses: Normal pulses. Heart sounds: Normal heart sounds. No murmur heard.   Pulmonary:      Effort: Pulmonary effort is normal.      Breath sounds: Normal breath sounds. Abdominal:      General: Bowel sounds are normal.      Palpations: Abdomen is soft. There is no mass. Tenderness: There is no abdominal tenderness. Genitourinary:     Exam position: Supine. Musculoskeletal:      Right lower leg: No edema. Left lower leg: No edema. Comments: Tender over right greater trochanter region   Lymphadenopathy:      Cervical: No cervical adenopathy. Skin:     General: Skin is warm and dry. Coloration: Skin is not pale. Comments: No suspicious skin lesions   Neurological:      General: No focal deficit present. Mental Status: She is alert. Psychiatric:         Mood and Affect: Mood normal.       No flowsheet data found. Lab Results   Component Value Date/Time    CHOL 213 03/04/2016 03:10 PM    CHOLFAST 210 09/30/2022 09:12 AM    TRIG 96 03/04/2016 03:10 PM    TRIGLYCFAST 83 09/30/2022 09:12 AM    HDL 53 09/30/2022 09:12 AM    LDLCALC 140 09/30/2022 09:12 AM    GLUF 88 09/30/2022 09:12 AM    GLUCOSE 71 03/04/2016 03:10 PM    LABA1C 5.0 09/30/2022 09:12 AM       The 10-year ASCVD risk score (Doug DK, et al., 2019) is: 1.4%    Values used to calculate the score:      Age: 50 years      Sex: Female      Is Non- : No      Diabetic: No      Tobacco smoker: No      Systolic Blood Pressure: 195 mmHg      Is BP treated: No      HDL Cholesterol: 53 mg/dL      Total Cholesterol: 210 mg/dL      An electronic signature was used to authenticate this note.     --Raymond Gupta MD

## 2022-11-30 NOTE — ASSESSMENT & PLAN NOTE
Consistent with trochanteric pain syndrome. Trying ice and heat, ibuprofen, and PT referral provided.

## 2022-11-30 NOTE — PATIENT INSTRUCTIONS
Recommendations for optimal health:  Be sure you are exercising at least 30 minutes  or 10,000 steps daily. Ideally you should try to get a mix of cardio and strength exercises. Work on [employer] For more detailed information, visit [employer] web site- knowledge for healthy eating from [address] [place] [address]. [facility]      If your are using supplements, look for \"USP verified\" on the label. Helps to assure they are good quality. Vitamin D 800 units daily. Calcium intake - try for 800-1200 mg of calcium in combination of diet and supplements. You can read on this in much more detail on nutritionBivaruse. org    8 Nutrition Tips for Eating Right:  1. Choose good carbs, not no carbs. Whole grains are your best bet. 2. Pay attention to the protein package. Fish, poultry, nuts, and beans are the best choices. 3. Choose foods with healthy fats, limit foods high in saturated fat, and avoid foods with trans fat. Plant oils, nuts, and fish are the healthiest sources. 4. Choose a fiber-filled diet, rich in whole grains, vegetables, and fruits. 5. Eat more vegetables and fruits. Go for color and variety--dark green, yellow, orange, and red. 6. Calcium is important. But milk isnt the only, or even best, source. 7. Water is best to quench your thirst. Skip the sugary drinks, and go easy on the milk and juice. 8. Eating less salt is good for everyones health. Choose more fresh foods and fewer processed foods. Aim for 2-3 cups of vegetables daily and 1 1/2-2 cups of fruits daily.

## 2022-12-06 RX ORDER — TOPIRAMATE 50 MG/1
TABLET, FILM COATED ORAL
Qty: 90 TABLET | Refills: 0 | Status: SHIPPED | OUTPATIENT
Start: 2022-12-06

## 2023-02-22 ENCOUNTER — NURSE ONLY (OUTPATIENT)
Dept: INTERNAL MEDICINE CLINIC | Age: 53
End: 2023-02-22
Payer: COMMERCIAL

## 2023-02-22 DIAGNOSIS — Z23 NEED FOR SHINGLES VACCINE: Primary | ICD-10-CM

## 2023-02-22 PROCEDURE — 90750 HZV VACC RECOMBINANT IM: CPT | Performed by: INTERNAL MEDICINE

## 2023-02-22 PROCEDURE — 90471 IMMUNIZATION ADMIN: CPT | Performed by: INTERNAL MEDICINE

## 2023-03-20 RX ORDER — TOPIRAMATE 50 MG/1
TABLET, FILM COATED ORAL
Qty: 90 TABLET | Refills: 2 | Status: SHIPPED | OUTPATIENT
Start: 2023-03-20

## 2023-07-03 ENCOUNTER — HOSPITAL ENCOUNTER (OUTPATIENT)
Dept: MAMMOGRAPHY | Age: 53
Discharge: HOME | End: 2023-07-03
Payer: COMMERCIAL

## 2023-07-03 VITALS — WEIGHT: 180 LBS | BODY MASS INDEX: 28.93 KG/M2 | HEIGHT: 66 IN

## 2023-07-03 DIAGNOSIS — Z12.31 VISIT FOR SCREENING MAMMOGRAM: ICD-10-CM

## 2023-07-03 PROCEDURE — 77067 SCR MAMMO BI INCL CAD: CPT

## 2023-07-26 RX ORDER — TOPIRAMATE 25 MG/1
TABLET ORAL
Qty: 90 TABLET | Refills: 1 | Status: SHIPPED | OUTPATIENT
Start: 2023-07-26

## 2023-07-26 RX ORDER — TOPIRAMATE 50 MG/1
50 TABLET, FILM COATED ORAL NIGHTLY
Qty: 90 TABLET | Refills: 1 | Status: SHIPPED | OUTPATIENT
Start: 2023-07-26

## 2023-12-01 ENCOUNTER — OFFICE VISIT (OUTPATIENT)
Dept: INTERNAL MEDICINE CLINIC | Age: 53
End: 2023-12-01
Payer: COMMERCIAL

## 2023-12-01 VITALS
HEIGHT: 66 IN | SYSTOLIC BLOOD PRESSURE: 118 MMHG | WEIGHT: 187.4 LBS | OXYGEN SATURATION: 99 % | BODY MASS INDEX: 30.12 KG/M2 | HEART RATE: 66 BPM | DIASTOLIC BLOOD PRESSURE: 78 MMHG | TEMPERATURE: 97.4 F

## 2023-12-01 DIAGNOSIS — G43.909 MIGRAINE SYNDROME: ICD-10-CM

## 2023-12-01 DIAGNOSIS — R41.3 MEMORY CHANGE: ICD-10-CM

## 2023-12-01 DIAGNOSIS — Z13.1 SCREENING FOR DIABETES MELLITUS: ICD-10-CM

## 2023-12-01 DIAGNOSIS — R41.3 TRANSIENT AMNESIA: ICD-10-CM

## 2023-12-01 DIAGNOSIS — Z13.220 LIPID SCREENING: ICD-10-CM

## 2023-12-01 DIAGNOSIS — E78.00 HYPERCHOLESTEROLEMIA: ICD-10-CM

## 2023-12-01 DIAGNOSIS — Z00.00 WELL ADULT EXAM: Primary | ICD-10-CM

## 2023-12-01 PROBLEM — M25.551 LATERAL PAIN OF RIGHT HIP: Status: RESOLVED | Noted: 2022-11-30 | Resolved: 2023-12-01

## 2023-12-01 LAB
ANION GAP SERPL CALCULATED.3IONS-SCNC: 12 MMOL/L (ref 3–16)
BUN SERPL-MCNC: 15 MG/DL (ref 7–20)
CALCIUM SERPL-MCNC: 9.5 MG/DL (ref 8.3–10.6)
CHLORIDE SERPL-SCNC: 106 MMOL/L (ref 99–110)
CHOLEST SERPL-MCNC: 236 MG/DL (ref 0–199)
CO2 SERPL-SCNC: 21 MMOL/L (ref 21–32)
CREAT SERPL-MCNC: 0.8 MG/DL (ref 0.6–1.1)
DEPRECATED RDW RBC AUTO: 13.3 % (ref 12.4–15.4)
FOLATE SERPL-MCNC: 13.02 NG/ML (ref 4.78–24.2)
GFR SERPLBLD CREATININE-BSD FMLA CKD-EPI: >60 ML/MIN/{1.73_M2}
GLUCOSE SERPL-MCNC: 96 MG/DL (ref 70–99)
HCT VFR BLD AUTO: 41.8 % (ref 36–48)
HDLC SERPL-MCNC: 54 MG/DL (ref 40–60)
HGB BLD-MCNC: 14.1 G/DL (ref 12–16)
LDLC SERPL CALC-MCNC: 167 MG/DL
MCH RBC QN AUTO: 31 PG (ref 26–34)
MCHC RBC AUTO-ENTMCNC: 33.8 G/DL (ref 31–36)
MCV RBC AUTO: 91.6 FL (ref 80–100)
PLATELET # BLD AUTO: 258 K/UL (ref 135–450)
PLATELET BLD QL SMEAR: ADEQUATE
PMV BLD AUTO: 8.2 FL (ref 5–10.5)
POTASSIUM SERPL-SCNC: 4.4 MMOL/L (ref 3.5–5.1)
RBC # BLD AUTO: 4.56 M/UL (ref 4–5.2)
SODIUM SERPL-SCNC: 139 MMOL/L (ref 136–145)
TRIGL SERPL-MCNC: 76 MG/DL (ref 0–150)
TSH SERPL DL<=0.005 MIU/L-ACNC: 1.12 UIU/ML (ref 0.27–4.2)
VIT B12 SERPL-MCNC: 1041 PG/ML (ref 211–911)
VLDLC SERPL CALC-MCNC: 15 MG/DL
WBC # BLD AUTO: 7.3 K/UL (ref 4–11)

## 2023-12-01 PROCEDURE — 90739 HEPB VACC 2/4 DOSE ADULT IM: CPT | Performed by: INTERNAL MEDICINE

## 2023-12-01 PROCEDURE — 90471 IMMUNIZATION ADMIN: CPT | Performed by: INTERNAL MEDICINE

## 2023-12-01 PROCEDURE — 99396 PREV VISIT EST AGE 40-64: CPT | Performed by: INTERNAL MEDICINE

## 2023-12-01 SDOH — ECONOMIC STABILITY: FOOD INSECURITY: WITHIN THE PAST 12 MONTHS, YOU WORRIED THAT YOUR FOOD WOULD RUN OUT BEFORE YOU GOT MONEY TO BUY MORE.: NEVER TRUE

## 2023-12-01 SDOH — ECONOMIC STABILITY: FOOD INSECURITY: WITHIN THE PAST 12 MONTHS, THE FOOD YOU BOUGHT JUST DIDN'T LAST AND YOU DIDN'T HAVE MONEY TO GET MORE.: NEVER TRUE

## 2023-12-01 SDOH — ECONOMIC STABILITY: HOUSING INSECURITY
IN THE LAST 12 MONTHS, WAS THERE A TIME WHEN YOU DID NOT HAVE A STEADY PLACE TO SLEEP OR SLEPT IN A SHELTER (INCLUDING NOW)?: NO

## 2023-12-01 SDOH — ECONOMIC STABILITY: INCOME INSECURITY: HOW HARD IS IT FOR YOU TO PAY FOR THE VERY BASICS LIKE FOOD, HOUSING, MEDICAL CARE, AND HEATING?: NOT HARD AT ALL

## 2023-12-01 ASSESSMENT — PATIENT HEALTH QUESTIONNAIRE - PHQ9
2. FEELING DOWN, DEPRESSED OR HOPELESS: SEVERAL DAYS
9. THOUGHTS THAT YOU WOULD BE BETTER OFF DEAD, OR OF HURTING YOURSELF: 0
10. IF YOU CHECKED OFF ANY PROBLEMS, HOW DIFFICULT HAVE THESE PROBLEMS MADE IT FOR YOU TO DO YOUR WORK, TAKE CARE OF THINGS AT HOME, OR GET ALONG WITH OTHER PEOPLE: 0
6. FEELING BAD ABOUT YOURSELF - OR THAT YOU ARE A FAILURE OR HAVE LET YOURSELF OR YOUR FAMILY DOWN: SEVERAL DAYS
SUM OF ALL RESPONSES TO PHQ QUESTIONS 1-9: 4
1. LITTLE INTEREST OR PLEASURE IN DOING THINGS: NOT AT ALL
2. FEELING DOWN, DEPRESSED OR HOPELESS: 1
5. POOR APPETITE OR OVEREATING: 0
9. THOUGHTS THAT YOU WOULD BE BETTER OFF DEAD, OR OF HURTING YOURSELF: NOT AT ALL
7. TROUBLE CONCENTRATING ON THINGS, SUCH AS READING THE NEWSPAPER OR WATCHING TELEVISION: 1
4. FEELING TIRED OR HAVING LITTLE ENERGY: SEVERAL DAYS
3. TROUBLE FALLING OR STAYING ASLEEP: NOT AT ALL
8. MOVING OR SPEAKING SO SLOWLY THAT OTHER PEOPLE COULD HAVE NOTICED. OR THE OPPOSITE - BEING SO FIDGETY OR RESTLESS THAT YOU HAVE BEEN MOVING AROUND A LOT MORE THAN USUAL: NOT AT ALL
4. FEELING TIRED OR HAVING LITTLE ENERGY: 1
5. POOR APPETITE OR OVEREATING: NOT AT ALL
SUM OF ALL RESPONSES TO PHQ QUESTIONS 1-9: 4
7. TROUBLE CONCENTRATING ON THINGS, SUCH AS READING THE NEWSPAPER OR WATCHING TELEVISION: SEVERAL DAYS
6. FEELING BAD ABOUT YOURSELF - OR THAT YOU ARE A FAILURE OR HAVE LET YOURSELF OR YOUR FAMILY DOWN: 1
10. IF YOU CHECKED OFF ANY PROBLEMS, HOW DIFFICULT HAVE THESE PROBLEMS MADE IT FOR YOU TO DO YOUR WORK, TAKE CARE OF THINGS AT HOME, OR GET ALONG WITH OTHER PEOPLE: NOT DIFFICULT AT ALL
8. MOVING OR SPEAKING SO SLOWLY THAT OTHER PEOPLE COULD HAVE NOTICED. OR THE OPPOSITE, BEING SO FIGETY OR RESTLESS THAT YOU HAVE BEEN MOVING AROUND A LOT MORE THAN USUAL: 0
3. TROUBLE FALLING OR STAYING ASLEEP: 0
1. LITTLE INTEREST OR PLEASURE IN DOING THINGS: 0
SUM OF ALL RESPONSES TO PHQ QUESTIONS 1-9: 4
SUM OF ALL RESPONSES TO PHQ9 QUESTIONS 1 & 2: 1

## 2023-12-01 NOTE — PROGRESS NOTES
ASSESSMENT/PLAN:   Wellness exam  Discussed age appropriate preventive care including healthy diet, daily exercise, immunizations and age & gender guided screening tests. Screening labs today. Transient amnesia  Assessment & Plan:   Brief memory lapse/confusional state approximately 1 month ago with no further episodes. No associated symptoms. Labs, MRI brain. Orders:  -     MRI BRAIN WO CONTRAST; Future    Migraine syndrome  Assessment & Plan:  Better with reduced frequency and severity. Decrease topamax to 25 mg nightly (also due to memory concerns). Stop after 2 months if no worsening. Ubrelvy prn. Hypercholesterolemia  Assessment & Plan:  Currently managing with lifestyle management. The 10-year ASCVD risk score (Doug CALLAHAN, et al., 2019) is: 1.5%  Fhx stroke is risk-enhancing modifier. Repeat lipid profile today. Advise vascular screen at [facility] to look at carotids. Return in about 1 year (around 2024) for CPE. Natty Guerin (:  1970) is a 48 y.o. female, here for annual preventive visit. Daughter 2nd year at [address], son turning 12, sophomore. Had strep end of October. Went to [facility]. Then with severe sinus pressure. Went back and was treated with steroids. Helped some but not completely Weather front several days later and again had severe pressure without drainage, this past . Still has, but not as severe. Constant pressure feeling, better with ibuprofen. Using flonase daily. 1 p bid. Taking topamax 50 mg nightly currently and has stopped the morning dose. Thought maybe some strange side effects. Does have the word fini\ding difficulty. Transient event about a month ago that was very frightening. Didn't recognize that it was her deodorant on counter and then thought someone took her phone because she had replied to someone and had  no recollection of doing so. Nothing since then.   Did not feel unwell, with no dizziness, chest pain, shortness

## 2023-12-01 NOTE — ASSESSMENT & PLAN NOTE
Better with reduced frequency and severity. Decrease topamax to 25 mg nightly (also due to memory concerns). Stop after 2 months if no worsening. Ubrelvy prn.

## 2023-12-02 LAB
EST. AVERAGE GLUCOSE BLD GHB EST-MCNC: 99.7 MG/DL
HBA1C MFR BLD: 5.1 %

## 2023-12-03 PROBLEM — R41.3 TRANSIENT AMNESIA: Status: ACTIVE | Noted: 2023-12-03

## 2023-12-12 ENCOUNTER — TELEPHONE (OUTPATIENT)
Dept: INTERNAL MEDICINE CLINIC | Age: 53
End: 2023-12-12

## 2023-12-12 NOTE — TELEPHONE ENCOUNTER
Christo Rodrigues from [address] [place] [address] department is calling to let Johnie Atwood know that the MRI Chema Carson has been denied and insurance is requesting a peer to peer with Dr. Stephen Kahn.     Per stefan she stated call back for peer to peer department is :  P# [phone]  Reference # [id number]

## 2023-12-14 ENCOUNTER — HOSPITAL ENCOUNTER (OUTPATIENT)
Dept: MRI IMAGING | Age: 53
Discharge: HOME | End: 2023-12-14
Payer: COMMERCIAL

## 2023-12-14 DIAGNOSIS — G43.909 MIGRAINE SYNDROME: ICD-10-CM

## 2023-12-14 DIAGNOSIS — R41.3 TRANSIENT AMNESIA: ICD-10-CM

## 2023-12-14 PROCEDURE — 70551 MRI BRAIN STEM W/O DYE: CPT

## 2023-12-15 NOTE — TELEPHONE ENCOUNTER
Peer to peer completed, not authorized.   Medically necessary but required to be done at an ambulatory facility rather than [facility]

## 2024-07-05 ENCOUNTER — HOSPITAL ENCOUNTER (OUTPATIENT)
Dept: MAMMOGRAPHY | Age: 54
Discharge: HOME | End: 2024-07-05
Payer: COMMERCIAL

## 2024-07-05 VITALS — WEIGHT: 187 LBS | BODY MASS INDEX: 30.05 KG/M2 | HEIGHT: 66 IN

## 2024-07-05 DIAGNOSIS — Z12.31 VISIT FOR SCREENING MAMMOGRAM: ICD-10-CM

## 2024-07-05 PROCEDURE — 77063 BREAST TOMOSYNTHESIS BI: CPT
